# Patient Record
Sex: FEMALE | Race: WHITE | NOT HISPANIC OR LATINO | URBAN - METROPOLITAN AREA
[De-identification: names, ages, dates, MRNs, and addresses within clinical notes are randomized per-mention and may not be internally consistent; named-entity substitution may affect disease eponyms.]

---

## 2021-04-30 ENCOUNTER — APPOINTMENT (RX ONLY)
Dept: URBAN - METROPOLITAN AREA CLINIC 39 | Facility: CLINIC | Age: 56
Setting detail: DERMATOLOGY
End: 2021-04-30

## 2021-04-30 DIAGNOSIS — L72.8 OTHER FOLLICULAR CYSTS OF THE SKIN AND SUBCUTANEOUS TISSUE: ICD-10-CM

## 2021-04-30 PROCEDURE — ? DEFER

## 2021-04-30 PROCEDURE — ? TREATMENT REGIMEN

## 2021-04-30 PROCEDURE — ? COUNSELING

## 2021-04-30 PROCEDURE — 99202 OFFICE O/P NEW SF 15 MIN: CPT

## 2021-04-30 ASSESSMENT — LOCATION DETAILED DESCRIPTION DERM: LOCATION DETAILED: SUPERIOR THORACIC SPINE

## 2021-04-30 ASSESSMENT — LOCATION SIMPLE DESCRIPTION DERM: LOCATION SIMPLE: UPPER BACK

## 2021-04-30 ASSESSMENT — LOCATION ZONE DERM: LOCATION ZONE: TRUNK

## 2021-04-30 NOTE — PROCEDURE: TREATMENT REGIMEN
Detail Level: Zone
Plan: Patient will call our office if the cyst becomes bothersome. An antibiotic can be called into her pharmacy if needed. Patient declines scheduling an excision procedure at this time, but will call our office when she decides she wants to proceed.\\n\\nPHOTOS TAKEN - Lesion Size 1.3cm x 1.3cm

## 2021-04-30 NOTE — PROCEDURE: DEFER
Procedure To Be Performed At Next Visit: Excision
Introduction Text (Please End With A Colon): The following procedure was deferred:
Instructions (Optional): Lesion Size: 1.3cm x 1.3cm
Detail Level: Detailed